# Patient Record
Sex: FEMALE | Race: WHITE
[De-identification: names, ages, dates, MRNs, and addresses within clinical notes are randomized per-mention and may not be internally consistent; named-entity substitution may affect disease eponyms.]

---

## 2018-11-30 ENCOUNTER — HOSPITAL ENCOUNTER (EMERGENCY)
Dept: HOSPITAL 26 - MED | Age: 23
Discharge: HOME | End: 2018-11-30
Payer: COMMERCIAL

## 2018-11-30 VITALS — HEIGHT: 63 IN | WEIGHT: 139 LBS | BODY MASS INDEX: 24.63 KG/M2

## 2018-11-30 VITALS — DIASTOLIC BLOOD PRESSURE: 82 MMHG | SYSTOLIC BLOOD PRESSURE: 117 MMHG

## 2018-11-30 VITALS — DIASTOLIC BLOOD PRESSURE: 58 MMHG | SYSTOLIC BLOOD PRESSURE: 106 MMHG

## 2018-11-30 DIAGNOSIS — B34.9: Primary | ICD-10-CM

## 2018-11-30 PROCEDURE — 99283 EMERGENCY DEPT VISIT LOW MDM: CPT

## 2018-11-30 PROCEDURE — 36415 COLL VENOUS BLD VENIPUNCTURE: CPT

## 2018-11-30 PROCEDURE — 87804 INFLUENZA ASSAY W/OPTIC: CPT

## 2018-11-30 NOTE — NUR
23/F C/O BODYACHES, CHILLS, N/V STARTED THIS MORNING. ALSO REPORT NASAL 
CONGESTION. DENIES FEVER, COUGH/SOB. ALL LUNG SOUNDS CBTA, 16RR EVEN AND 
UNLABORED. DENIES PMH, TOOK TYLENOL AT 1830

## 2018-11-30 NOTE — NUR
Patient discharged with v/s stable. Written and verbal after care instructions 
given and explained. 

Patient alert, oriented and verbalized understanding of instructions. 
Ambulatory with steady gait. All questions addressed prior to discharge. ID 
band removed. Patient advised to follow up with PMD. Rx of ZOFRAN, IBUPROFEN 
given. Patient educated on indication of medication including possible reaction 
and side effects. Opportunity to ask questions provided and answered.